# Patient Record
(demographics unavailable — no encounter records)

---

## 2024-12-11 NOTE — REASON FOR VISIT
[Follow-up Visit ___] : a follow-up visit  for [unfilled] [[employer] ] : provided by [employer]   [Interpreters_IDNumber] : [id number] [Interpreters_FullName] : Lisbeth [TWNoteComboBox1] : Somali

## 2024-12-11 NOTE — ASSESSMENT
[FreeTextEntry1] : Elevated PSA. MRI showing no lesions on radiology reading, PSA density greater than 0.15. On our personal read, there appears to be a lesion in the right transitional zone.  Clearly outlined concern for potential prostate cancer given that his PSA density is greater than 0.15. Recommend proceeding with transperineal prostate biopsy. Procedure reviewed. Risks clearly outlined including bleeding, infection, and urinary retention. He understands this will be done under brief course of anesthesia.  Time allotted for patient asked questions and all questions answered.  OR order placed. [Urinary Symptom or Sign (788.99\R39.89)] : implantation

## 2024-12-11 NOTE — END OF VISIT
[FreeTextEntry3] : I performed history/physical, formulated treatment plans are discussed with the patient agree with above transcription by the physicians assistant

## 2024-12-11 NOTE — HISTORY OF PRESENT ILLNESS
[FreeTextEntry1] : Alan is a 60-year-old male born February 2, 1964 who presents to office for consultation for elevated PSA. Past medical history of diabetes and high cholesterol, medically managed.  PSA June 2024 was 3.4 ng/mL and repeat in October 2024 was 5.8 ng/mL. MRI of prostate showing no suspicious lesions, 30 cc prostate, PSA density greater than 0.15. Urinary bladder wall thickening and trabeculation.  He does have urinary symptoms including nocturia, but is not bothered by this and is being monitored. Denies any dysuria and any gross hematuria.  No prior urologic history Family History: No known family history of prostate cancer Social History: Patient is from [place]. Works in maintenance. Never smoked.  Consultation requested by Dr. Terri Perez

## 2024-12-23 NOTE — HISTORY OF PRESENT ILLNESS
[FreeTextEntry1] : Mr. Ureña reports that he has been experiencing pain in his thumb. He suspects that he might have injured himself in the past while handling a cracked glass bottle. He mentions that he has had an X-Ray where it was suggested he may have a foreign object, possibly metal or glass in his thumb causing the pain.

## 2024-12-23 NOTE — ASSESSMENT
[FreeTextEntry1] : Patient will obtain x-rays of hand so I could better visualize foreign body and better discussed surgical options

## 2025-01-13 NOTE — ASSESSMENT
[FreeTextEntry1] : 60-year-old male presents to my office for follow-up x-ray x-ray reveals 1 to 2 mm foreign body on the volar aspect of the distal portion of the pulp of his thumb.  Explained to the patient his pain is probably not coming from this foreign body.  To rule it out will do a exploration of the finger and washout explained to the patient after the surgery he might still have the pain because he has arthritis in that IP joint.  Explained to the patient about his wrist pain I do not do wrist I can refer him to Dr. Villareal  for further wrist treatment

## 2025-01-13 NOTE — HISTORY OF PRESENT ILLNESS
[FreeTextEntry1] : Mr. Ureña reports that he has been experiencing pain in his thumb. He suspects that he might have injured himself in the past while handling a cracked glass bottle. He mentions that he has had an X-Ray where it was suggested he may have a foreign object, possibly metal or glass in his thumb causing the pain. interval hx: pt still complains of pain, xray reveal 1mm -2 mm foreign body

## 2025-01-21 NOTE — HISTORY OF PRESENT ILLNESS
[FreeTextEntry1] : History of elevated PSA to 5.8.  MRI showed no suspicious lesions but PSA density remains elevated.  He underwent transperineal biopsy and all cores were benign.  He feels well without urinary complaint.  Language line  Cristobal [id number] provided Faroese translation.

## 2025-01-21 NOTE — ASSESSMENT
[FreeTextEntry1] : BPH.  His chronic condition requiring longitudinal follow-up.  Will continue to monitor symptoms.  Elevated PSA.  Benign biopsy.  Repeat PSA in 6 months.

## 2025-07-23 NOTE — HISTORY OF PRESENT ILLNESS
[FreeTextEntry1] : Alan is a 61-year-old with elevated PSA as high as 5.8 ng/mL October 2024.  He underwent MRI which showed no suspicious lesion but PSA density was high and he underwent transperineal biopsy and all cores were benign.  Biopsy done January 2025.  Prostate volume 30 cc.  Presents to office to review his most recent PSA in July 2025 which is now 3.0 ng/mL.  He reports feeling well.  Denies any difficulties urinating.  Denies dysuria and gross hematuria.

## 2025-07-23 NOTE — END OF VISIT
[FreeTextEntry3] :  I obtained history/physical, formulated a treatment plan which I discussed with the patient and agree with the above transcription by the physicians assistant.

## 2025-07-23 NOTE — REASON FOR VISIT
[Follow-up Visit ___] : a follow-up visit  for [unfilled] [[employer] ] : provided by [employer]   [Interpreters_IDNumber] : [id number] [Interpreters_FullName] : Rigoberto [TWNoteComboBox1] : Cymro

## 2025-07-23 NOTE — ASSESSMENT
[FreeTextEntry1] : BPH.  This is a chronic condition requiring longitudinal follow-up.  Will continue to monitor symptoms.  Elevated PSA.  History of benign biopsy.  PSA has now decreased to 3.0 and PSA density would be now 0.1.  Will continue close monitoring and repeat PSA in 6 months and follow-up to review.